# Patient Record
Sex: FEMALE | Race: WHITE | URBAN - METROPOLITAN AREA
[De-identification: names, ages, dates, MRNs, and addresses within clinical notes are randomized per-mention and may not be internally consistent; named-entity substitution may affect disease eponyms.]

---

## 2017-01-04 ENCOUNTER — HOSPITAL ENCOUNTER (OUTPATIENT)
Dept: WOUND CARE | Age: 82
Discharge: HOME | End: 2017-01-04
Attending: INTERNAL MEDICINE | Admitting: INTERNAL MEDICINE

## 2017-01-04 VITALS
HEART RATE: 64 BPM | TEMPERATURE: 98.4 F | DIASTOLIC BLOOD PRESSURE: 72 MMHG | RESPIRATION RATE: 16 BRPM | SYSTOLIC BLOOD PRESSURE: 159 MMHG

## 2017-01-04 DIAGNOSIS — L89.522 DECUBITUS ULCER OF LEFT ANKLE, STAGE 2 (HCC): ICD-10-CM

## 2017-01-04 DIAGNOSIS — L89.622 DECUBITUS ULCER OF LEFT HEEL, STAGE 2 (HCC): Primary | ICD-10-CM

## 2017-01-04 PROCEDURE — 11042 DBRDMT SUBQ TIS 1ST 20SQCM/<: CPT | Performed by: INTERNAL MEDICINE

## 2017-01-04 RX ORDER — LIDOCAINE HYDROCHLORIDE 40 MG/ML
SOLUTION TOPICAL ONCE
Status: DISCONTINUED | OUTPATIENT
Start: 2017-01-04 | End: 2017-01-05 | Stop reason: HOSPADM

## 2017-01-11 ENCOUNTER — HOSPITAL ENCOUNTER (OUTPATIENT)
Dept: WOUND CARE | Age: 82
Discharge: HOME | End: 2017-01-11
Attending: SURGERY | Admitting: SURGERY

## 2017-01-11 VITALS
DIASTOLIC BLOOD PRESSURE: 73 MMHG | SYSTOLIC BLOOD PRESSURE: 150 MMHG | HEART RATE: 60 BPM | RESPIRATION RATE: 16 BRPM | TEMPERATURE: 97.2 F

## 2017-01-11 DIAGNOSIS — L89.622 DECUBITUS ULCER OF LEFT HEEL, STAGE 2 (HCC): ICD-10-CM

## 2017-01-11 DIAGNOSIS — L89.522 DECUBITUS ULCER OF LEFT ANKLE, STAGE 2 (HCC): Primary | ICD-10-CM

## 2017-01-11 PROCEDURE — 11042 DBRDMT SUBQ TIS 1ST 20SQCM/<: CPT | Performed by: SURGERY

## 2017-01-18 ENCOUNTER — HOSPITAL ENCOUNTER (OUTPATIENT)
Dept: WOUND CARE | Age: 82
Discharge: HOME | End: 2017-01-18
Attending: SURGERY | Admitting: SURGERY

## 2017-01-18 VITALS
HEART RATE: 61 BPM | RESPIRATION RATE: 16 BRPM | DIASTOLIC BLOOD PRESSURE: 74 MMHG | SYSTOLIC BLOOD PRESSURE: 148 MMHG | TEMPERATURE: 97.1 F

## 2017-01-18 DIAGNOSIS — L89.522 DECUBITUS ULCER OF LEFT ANKLE, STAGE 2 (HCC): Primary | ICD-10-CM

## 2017-01-18 DIAGNOSIS — L89.622 DECUBITUS ULCER OF LEFT HEEL, STAGE 2 (HCC): ICD-10-CM

## 2017-01-18 PROCEDURE — 11042 DBRDMT SUBQ TIS 1ST 20SQCM/<: CPT | Performed by: SURGERY

## 2017-01-25 ENCOUNTER — HOSPITAL ENCOUNTER (OUTPATIENT)
Dept: WOUND CARE | Age: 82
Discharge: HOME | End: 2017-01-25
Attending: SURGERY | Admitting: SURGERY

## 2017-01-25 VITALS
TEMPERATURE: 96.3 F | RESPIRATION RATE: 16 BRPM | HEART RATE: 88 BPM | SYSTOLIC BLOOD PRESSURE: 159 MMHG | DIASTOLIC BLOOD PRESSURE: 77 MMHG

## 2017-01-25 DIAGNOSIS — L89.522 DECUBITUS ULCER OF LEFT ANKLE, STAGE 2 (HCC): ICD-10-CM

## 2017-01-25 DIAGNOSIS — L89.622 DECUBITUS ULCER OF LEFT HEEL, STAGE 2 (HCC): Primary | ICD-10-CM

## 2017-01-25 PROCEDURE — 11042 DBRDMT SUBQ TIS 1ST 20SQCM/<: CPT | Performed by: SURGERY

## 2017-01-30 ENCOUNTER — TELEPHONE (OUTPATIENT)
Dept: CARDIOLOGY CLINIC | Age: 82
End: 2017-01-30

## 2017-01-30 ENCOUNTER — PROCEDURE VISIT (OUTPATIENT)
Dept: CARDIOLOGY CLINIC | Age: 82
End: 2017-01-30

## 2017-01-30 DIAGNOSIS — Z95.0 CARDIAC PACEMAKER IN SITU: Primary | ICD-10-CM

## 2017-01-30 PROCEDURE — 93294 REM INTERROG EVL PM/LDLS PM: CPT | Performed by: INTERNAL MEDICINE

## 2017-01-30 PROCEDURE — 93296 REM INTERROG EVL PM/IDS: CPT | Performed by: INTERNAL MEDICINE

## 2017-02-01 ENCOUNTER — HOSPITAL ENCOUNTER (OUTPATIENT)
Dept: OTHER | Age: 82
Discharge: HOME | End: 2017-02-28
Attending: INTERNAL MEDICINE | Admitting: INTERNAL MEDICINE

## 2017-02-01 ENCOUNTER — HOSPITAL ENCOUNTER (OUTPATIENT)
Dept: WOUND CARE | Age: 82
Discharge: HOME | End: 2017-02-01
Attending: SURGERY | Admitting: SURGERY

## 2017-02-01 VITALS
HEART RATE: 63 BPM | RESPIRATION RATE: 16 BRPM | TEMPERATURE: 97.3 F | SYSTOLIC BLOOD PRESSURE: 134 MMHG | DIASTOLIC BLOOD PRESSURE: 78 MMHG

## 2017-02-01 DIAGNOSIS — L89.622 DECUBITUS ULCER OF LEFT HEEL, STAGE 2 (HCC): ICD-10-CM

## 2017-02-01 DIAGNOSIS — L89.522 DECUBITUS ULCER OF LEFT ANKLE, STAGE 2 (HCC): Primary | ICD-10-CM

## 2017-02-01 PROCEDURE — 97597 DBRDMT OPN WND 1ST 20 CM/<: CPT | Performed by: SURGERY

## 2017-02-08 ENCOUNTER — HOSPITAL ENCOUNTER (OUTPATIENT)
Dept: WOUND CARE | Age: 82
Discharge: HOME | End: 2017-02-08
Attending: SURGERY | Admitting: SURGERY

## 2017-02-08 VITALS — SYSTOLIC BLOOD PRESSURE: 150 MMHG | HEART RATE: 60 BPM | TEMPERATURE: 97.6 F | DIASTOLIC BLOOD PRESSURE: 77 MMHG

## 2017-02-08 DIAGNOSIS — L89.622 DECUBITUS ULCER OF LEFT HEEL, STAGE 2 (HCC): Primary | ICD-10-CM

## 2017-02-08 PROCEDURE — 99213 OFFICE O/P EST LOW 20 MIN: CPT | Performed by: SURGERY

## 2017-02-14 LAB
ANION GAP SERPL CALCULATED.3IONS-SCNC: 15 MMOL/L (ref 4–16)
BUN BLDV-MCNC: 37 MG/DL (ref 6–23)
CALCIUM SERPL-MCNC: 9.8 MG/DL (ref 8.3–10.6)
CHLORIDE BLD-SCNC: 100 MMOL/L (ref 99–110)
CO2: 26 MMOL/L (ref 21–32)
CREAT SERPL-MCNC: 1 MG/DL (ref 0.6–1.1)
GFR AFRICAN AMERICAN: >60 ML/MIN/1.73M2
GFR NON-AFRICAN AMERICAN: 53 ML/MIN/1.73M2
GLUCOSE BLD-MCNC: 89 MG/DL (ref 70–140)
HCT VFR BLD CALC: 43.9 % (ref 37–47)
HEMOGLOBIN: 13.7 GM/DL (ref 12.5–16)
MCH RBC QN AUTO: 29.9 PG (ref 27–31)
MCHC RBC AUTO-ENTMCNC: 31.2 % (ref 32–36)
MCV RBC AUTO: 95.9 FL (ref 78–100)
PDW BLD-RTO: 14.7 % (ref 11.7–14.9)
PLATELET # BLD: 291 K/CU MM (ref 140–440)
PMV BLD AUTO: 9.9 FL (ref 7.5–11.1)
POTASSIUM SERPL-SCNC: 4.1 MMOL/L (ref 3.5–5.1)
RBC # BLD: 4.58 M/CU MM (ref 4.2–5.4)
SODIUM BLD-SCNC: 141 MMOL/L (ref 135–145)
WBC # BLD: 11 K/CU MM (ref 4–10.5)

## 2017-02-15 ENCOUNTER — HOSPITAL ENCOUNTER (OUTPATIENT)
Dept: WOUND CARE | Age: 82
Discharge: HOME | End: 2017-02-15
Attending: SURGERY | Admitting: SURGERY

## 2017-02-22 ENCOUNTER — HOSPITAL ENCOUNTER (OUTPATIENT)
Dept: WOUND CARE | Age: 82
Discharge: HOME | End: 2017-02-22
Attending: SURGERY | Admitting: SURGERY

## 2017-02-22 VITALS
RESPIRATION RATE: 16 BRPM | TEMPERATURE: 97.2 F | DIASTOLIC BLOOD PRESSURE: 73 MMHG | SYSTOLIC BLOOD PRESSURE: 154 MMHG | HEART RATE: 62 BPM

## 2017-02-22 DIAGNOSIS — L89.622 DECUBITUS ULCER OF LEFT HEEL, STAGE 2 (HCC): Primary | ICD-10-CM

## 2017-02-22 PROCEDURE — 99213 OFFICE O/P EST LOW 20 MIN: CPT | Performed by: SURGERY

## 2017-03-01 ENCOUNTER — HOSPITAL ENCOUNTER (OUTPATIENT)
Dept: OTHER | Age: 82
Discharge: HOME | End: 2017-03-31
Attending: INTERNAL MEDICINE | Admitting: INTERNAL MEDICINE

## 2017-04-01 ENCOUNTER — HOSPITAL ENCOUNTER (OUTPATIENT)
Dept: OTHER | Age: 82
Discharge: HOME | End: 2017-04-30
Attending: INTERNAL MEDICINE | Admitting: INTERNAL MEDICINE

## 2017-04-11 LAB
INR BLD: 1.31 INDEX
PROTHROMBIN TIME: 15 SECONDS

## 2017-04-13 LAB
INR BLD: 2.1 INDEX
PROTHROMBIN TIME: 24.5 SECONDS (ref 9.12–12.5)

## 2017-04-18 LAB
INR BLD: 4.15 INDEX
PROTHROMBIN TIME: 49.5 SECONDS

## 2017-04-20 LAB
INR BLD: 6.66 INDEX
PROTHROMBIN TIME: 80.5 SECONDS (ref 9.12–12.5)

## 2017-04-22 LAB
INR BLD: 1.62 INDEX
PROTHROMBIN TIME: 18.8 SECONDS (ref 9.12–12.5)

## 2017-04-28 LAB
INR BLD: 2.89 INDEX
PROTHROMBIN TIME: 34 SECONDS (ref 9.12–12.5)

## 2017-05-01 ENCOUNTER — HOSPITAL ENCOUNTER (OUTPATIENT)
Dept: OTHER | Age: 82
Discharge: HOME | End: 2017-05-31
Attending: INTERNAL MEDICINE | Admitting: INTERNAL MEDICINE

## 2017-05-02 LAB
INR BLD: 3.49 INDEX
PROTHROMBIN TIME: 41.3 SECONDS

## 2017-05-08 ENCOUNTER — PROCEDURE VISIT (OUTPATIENT)
Dept: CARDIOLOGY CLINIC | Age: 82
End: 2017-05-08

## 2017-05-08 DIAGNOSIS — Z95.0 CARDIAC PACEMAKER IN SITU: Primary | ICD-10-CM

## 2017-05-08 PROCEDURE — 93294 REM INTERROG EVL PM/LDLS PM: CPT | Performed by: INTERNAL MEDICINE

## 2017-05-08 PROCEDURE — 93296 REM INTERROG EVL PM/IDS: CPT | Performed by: INTERNAL MEDICINE

## 2017-06-01 ENCOUNTER — HOSPITAL ENCOUNTER (OUTPATIENT)
Dept: OTHER | Age: 82
Discharge: HOME | End: 2017-06-30
Attending: INTERNAL MEDICINE | Admitting: INTERNAL MEDICINE

## 2017-06-01 LAB
INR BLD: 3.12 INDEX
PROTHROMBIN TIME: 36.9 SECONDS (ref 9.12–12.5)

## 2017-07-01 ENCOUNTER — HOSPITAL ENCOUNTER (OUTPATIENT)
Dept: OTHER | Age: 82
Discharge: HOME | End: 2017-07-31
Attending: INTERNAL MEDICINE | Admitting: INTERNAL MEDICINE

## 2017-07-20 LAB
ANION GAP SERPL CALCULATED.3IONS-SCNC: 14 MMOL/L (ref 4–16)
BUN BLDV-MCNC: 32 MG/DL (ref 6–23)
CALCIUM SERPL-MCNC: 8.6 MG/DL (ref 8.3–10.6)
CHLORIDE BLD-SCNC: 105 MMOL/L (ref 99–110)
CO2: 22 MMOL/L (ref 21–32)
CREAT SERPL-MCNC: 0.8 MG/DL (ref 0.6–1.1)
GFR AFRICAN AMERICAN: >60 ML/MIN/1.73M2
GFR NON-AFRICAN AMERICAN: >60 ML/MIN/1.73M2
GLUCOSE BLD-MCNC: 71 MG/DL (ref 70–140)
HCT VFR BLD CALC: 31.7 % (ref 37–47)
HEMOGLOBIN: 10 GM/DL (ref 12.5–16)
MCH RBC QN AUTO: 30.1 PG (ref 27–31)
MCHC RBC AUTO-ENTMCNC: 31.5 % (ref 32–36)
MCV RBC AUTO: 95.5 FL (ref 78–100)
PDW BLD-RTO: 15.8 % (ref 11.7–14.9)
PLATELET # BLD: 263 K/CU MM (ref 140–440)
PMV BLD AUTO: 10 FL (ref 7.5–11.1)
POTASSIUM SERPL-SCNC: 4.1 MMOL/L (ref 3.5–5.1)
RBC # BLD: 3.32 M/CU MM (ref 4.2–5.4)
SODIUM BLD-SCNC: 141 MMOL/L (ref 135–145)
WBC # BLD: 12.8 K/CU MM (ref 4–10.5)

## 2017-07-25 LAB
INR BLD: >10.1 INDEX
PROTHROMBIN TIME: >120 SECONDS

## 2017-07-27 LAB
ESTIMATED AVERAGE GLUCOSE: 126 MG/DL
HBA1C MFR BLD: 6 % (ref 4.2–6.3)
HCT VFR BLD CALC: 35 % (ref 37–47)
HEMOGLOBIN: 10.8 GM/DL (ref 12.5–16)
INR BLD: 1.29 INDEX
MCH RBC QN AUTO: 29.8 PG (ref 27–31)
MCHC RBC AUTO-ENTMCNC: 30.9 % (ref 32–36)
MCV RBC AUTO: 96.4 FL (ref 78–100)
PDW BLD-RTO: 15.4 % (ref 11.7–14.9)
PLATELET # BLD: 425 K/CU MM (ref 140–440)
PMV BLD AUTO: 9.2 FL (ref 7.5–11.1)
PROTHROMBIN TIME: 14.8 SECONDS
RBC # BLD: 3.63 M/CU MM (ref 4.2–5.4)
TSH HIGH SENSITIVITY: 3.87 UIU/ML (ref 0.27–4.2)
VITAMIN B-12: 1211 PG/ML (ref 211–911)
WBC # BLD: 9.1 K/CU MM (ref 4–10.5)

## 2017-08-01 ENCOUNTER — HOSPITAL ENCOUNTER (OUTPATIENT)
Dept: OTHER | Age: 82
Discharge: HOME | End: 2017-08-31
Attending: INTERNAL MEDICINE | Admitting: INTERNAL MEDICINE

## 2017-08-03 ENCOUNTER — OFFICE VISIT (OUTPATIENT)
Dept: CARDIOLOGY CLINIC | Age: 82
End: 2017-08-03

## 2017-08-03 VITALS — SYSTOLIC BLOOD PRESSURE: 100 MMHG | HEART RATE: 60 BPM | DIASTOLIC BLOOD PRESSURE: 58 MMHG | HEIGHT: 62 IN

## 2017-08-03 DIAGNOSIS — Z95.0 S/P CARDIAC PACEMAKER PROCEDURE: ICD-10-CM

## 2017-08-03 DIAGNOSIS — Z95.0 CARDIAC PACEMAKER IN SITU: Primary | ICD-10-CM

## 2017-08-03 LAB
INR BLD: 1.57 INDEX
PROTHROMBIN TIME: 18.2 SECONDS

## 2017-08-03 PROCEDURE — 99213 OFFICE O/P EST LOW 20 MIN: CPT | Performed by: INTERNAL MEDICINE

## 2017-08-03 RX ORDER — FERROUS SULFATE 325(65) MG
325 TABLET ORAL
Status: ON HOLD | COMMUNITY
End: 2018-02-16 | Stop reason: HOSPADM

## 2017-08-03 RX ORDER — WARFARIN SODIUM 2 MG/1
1.5 TABLET ORAL DAILY
Status: ON HOLD | COMMUNITY
Start: 2017-06-08 | End: 2018-02-16 | Stop reason: HOSPADM

## 2017-08-14 ENCOUNTER — PROCEDURE VISIT (OUTPATIENT)
Dept: CARDIOLOGY CLINIC | Age: 82
End: 2017-08-14

## 2017-08-14 DIAGNOSIS — Z95.0 CARDIAC PACEMAKER IN SITU: Primary | ICD-10-CM

## 2017-08-14 PROCEDURE — 93296 REM INTERROG EVL PM/IDS: CPT | Performed by: INTERNAL MEDICINE

## 2017-08-14 PROCEDURE — 93294 REM INTERROG EVL PM/LDLS PM: CPT | Performed by: INTERNAL MEDICINE

## 2017-08-17 LAB
INR BLD: 3.03 INDEX
PROTHROMBIN TIME: 35.7 SECONDS

## 2017-09-01 ENCOUNTER — HOSPITAL ENCOUNTER (OUTPATIENT)
Dept: OTHER | Age: 82
Discharge: HOME | End: 2017-09-30
Attending: INTERNAL MEDICINE | Admitting: INTERNAL MEDICINE

## 2017-09-05 LAB
INR BLD: 3.15 INDEX
PROTHROMBIN TIME: 37.2 SECONDS

## 2017-09-21 LAB
INR BLD: 9.55 INDEX
PROTHROMBIN TIME: 116.7 SECONDS (ref 9.12–12.5)

## 2017-09-23 LAB
INR BLD: 1.31 INDEX
PROTHROMBIN TIME: 15 SECONDS (ref 9.12–12.5)

## 2017-09-26 LAB
INR BLD: 1.25 INDEX
PROTHROMBIN TIME: 14.4 SECONDS

## 2017-10-01 ENCOUNTER — HOSPITAL ENCOUNTER (OUTPATIENT)
Dept: OTHER | Age: 82
Discharge: HOME | End: 2017-10-31
Attending: INTERNAL MEDICINE | Admitting: INTERNAL MEDICINE

## 2017-10-05 LAB
INR BLD: 1.22 INDEX
PROTHROMBIN TIME: 14 SECONDS (ref 9.12–12.5)

## 2017-10-13 LAB
ALBUMIN SERPL-MCNC: 3.9 GM/DL (ref 3.4–5)
ALP BLD-CCNC: 58 IU/L (ref 40–128)
ALT SERPL-CCNC: 10 U/L (ref 10–40)
ANION GAP SERPL CALCULATED.3IONS-SCNC: 17 MMOL/L (ref 4–16)
AST SERPL-CCNC: 20 IU/L (ref 15–37)
BILIRUB SERPL-MCNC: 0.4 MG/DL (ref 0–1)
BUN BLDV-MCNC: 18 MG/DL (ref 6–23)
CALCIUM SERPL-MCNC: 9.9 MG/DL (ref 8.3–10.6)
CHLORIDE BLD-SCNC: 99 MMOL/L (ref 99–110)
CO2: 20 MMOL/L (ref 21–32)
CREAT SERPL-MCNC: 0.5 MG/DL (ref 0.6–1.1)
GFR AFRICAN AMERICAN: >60 ML/MIN/1.73M2
GFR NON-AFRICAN AMERICAN: >60 ML/MIN/1.73M2
GLUCOSE BLD-MCNC: 116 MG/DL (ref 70–140)
HCT VFR BLD CALC: 40.2 % (ref 37–47)
HEMOGLOBIN: 12 GM/DL (ref 12.5–16)
INR BLD: 1.44 INDEX
MCH RBC QN AUTO: 29.4 PG (ref 27–31)
MCHC RBC AUTO-ENTMCNC: 29.9 % (ref 32–36)
MCV RBC AUTO: 98.5 FL (ref 78–100)
PDW BLD-RTO: 16.4 % (ref 11.7–14.9)
PLATELET # BLD: 278 K/CU MM (ref 140–440)
PMV BLD AUTO: 10.5 FL (ref 7.5–11.1)
POTASSIUM SERPL-SCNC: 4.5 MMOL/L (ref 3.5–5.1)
PROTHROMBIN TIME: 16.6 SECONDS
RBC # BLD: 4.08 M/CU MM (ref 4.2–5.4)
SODIUM BLD-SCNC: 136 MMOL/L (ref 135–145)
TOTAL PROTEIN: 7.1 GM/DL (ref 6.4–8.2)
WBC # BLD: 8.8 K/CU MM (ref 4–10.5)

## 2017-11-01 ENCOUNTER — HOSPITAL ENCOUNTER (OUTPATIENT)
Dept: OTHER | Age: 82
Discharge: HOME | End: 2017-11-30
Attending: INTERNAL MEDICINE | Admitting: INTERNAL MEDICINE

## 2017-11-02 LAB
INR BLD: 1.33 INDEX
PROTHROMBIN TIME: 15.3 SECONDS

## 2017-11-16 LAB
INR BLD: 2.15 INDEX
PROTHROMBIN TIME: 25.1 SECONDS (ref 9.12–12.5)

## 2017-11-20 ENCOUNTER — TELEPHONE (OUTPATIENT)
Dept: CARDIOLOGY CLINIC | Age: 82
End: 2017-11-20

## 2017-11-21 ENCOUNTER — TELEPHONE (OUTPATIENT)
Dept: CARDIOLOGY CLINIC | Age: 82
End: 2017-11-21

## 2017-11-21 ENCOUNTER — PROCEDURE VISIT (OUTPATIENT)
Dept: CARDIOLOGY CLINIC | Age: 82
End: 2017-11-21

## 2017-11-21 DIAGNOSIS — Z95.0 CARDIAC PACEMAKER IN SITU: Primary | ICD-10-CM

## 2017-11-21 PROCEDURE — 93296 REM INTERROG EVL PM/IDS: CPT | Performed by: INTERNAL MEDICINE

## 2017-11-21 PROCEDURE — 93294 REM INTERROG EVL PM/LDLS PM: CPT | Performed by: INTERNAL MEDICINE

## 2017-12-01 ENCOUNTER — HOSPITAL ENCOUNTER (OUTPATIENT)
Dept: OTHER | Age: 82
Discharge: HOME | End: 2017-12-31
Attending: INTERNAL MEDICINE | Admitting: INTERNAL MEDICINE

## 2017-12-05 LAB
INR BLD: 1.41 INDEX
PROTHROMBIN TIME: 16 SECONDS (ref 9.12–12.5)

## 2018-01-01 ENCOUNTER — HOSPITAL ENCOUNTER (OUTPATIENT)
Dept: OTHER | Age: 83
Discharge: HOME | End: 2018-01-31
Attending: INTERNAL MEDICINE | Admitting: INTERNAL MEDICINE

## 2018-01-04 LAB
INR BLD: 1.85 INDEX
PROTHROMBIN TIME: 21 SECONDS (ref 9.12–12.5)

## 2018-01-11 LAB
INR BLD: 1.6 INDEX
PROTHROMBIN TIME: 18.1 SECONDS (ref 9.12–12.5)

## 2018-02-01 ENCOUNTER — HOSPITAL ENCOUNTER (OUTPATIENT)
Dept: OTHER | Age: 83
Discharge: HOME | End: 2018-02-28
Attending: INTERNAL MEDICINE | Admitting: INTERNAL MEDICINE

## 2018-02-14 PROBLEM — S52.571A CLOSED DIE PUNCH FRACTURE DISTAL RADIUS, RIGHT, INITIAL ENCOUNTER: Status: ACTIVE | Noted: 2018-02-14

## 2018-02-14 PROBLEM — S52.591A: Status: ACTIVE | Noted: 2018-02-14
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
## 2018-02-14 PROBLEM — N30.00 ACUTE CYSTITIS: Status: ACTIVE | Noted: 2018-02-14

## 2018-03-01 ENCOUNTER — HOSPITAL ENCOUNTER (OUTPATIENT)
Dept: OTHER | Age: 83
Discharge: HOME | End: 2018-03-31
Attending: INTERNAL MEDICINE | Admitting: INTERNAL MEDICINE

## 2018-03-08 LAB
ALBUMIN SERPL-MCNC: 3.1 GM/DL (ref 3.4–5)
ALP BLD-CCNC: 61 IU/L (ref 40–129)
ALT SERPL-CCNC: 6 U/L (ref 10–40)
ANION GAP SERPL CALCULATED.3IONS-SCNC: 12 MMOL/L (ref 4–16)
AST SERPL-CCNC: 14 IU/L (ref 15–37)
BASOPHILS ABSOLUTE: 0 K/CU MM
BASOPHILS RELATIVE PERCENT: 0.5 % (ref 0–1)
BILIRUB SERPL-MCNC: 0.2 MG/DL (ref 0–1)
BUN BLDV-MCNC: 19 MG/DL (ref 6–23)
CALCIUM SERPL-MCNC: 9 MG/DL (ref 8.3–10.6)
CHLORIDE BLD-SCNC: 103 MMOL/L (ref 99–110)
CO2: 22 MMOL/L (ref 21–32)
CREAT SERPL-MCNC: 0.7 MG/DL (ref 0.6–1.1)
DIFFERENTIAL TYPE: ABNORMAL
EOSINOPHILS ABSOLUTE: 0.2 K/CU MM
EOSINOPHILS RELATIVE PERCENT: 2.3 % (ref 0–3)
GFR AFRICAN AMERICAN: >60 ML/MIN/1.73M2
GFR NON-AFRICAN AMERICAN: >60 ML/MIN/1.73M2
GLUCOSE BLD-MCNC: 105 MG/DL (ref 70–99)
HCT VFR BLD CALC: 33.7 % (ref 37–47)
HEMOGLOBIN: 10.1 GM/DL (ref 12.5–16)
IMMATURE NEUTROPHIL %: 0.4 % (ref 0–0.43)
LYMPHOCYTES ABSOLUTE: 0.9 K/CU MM
LYMPHOCYTES RELATIVE PERCENT: 11.6 % (ref 24–44)
MCH RBC QN AUTO: 29.2 PG (ref 27–31)
MCHC RBC AUTO-ENTMCNC: 30 % (ref 32–36)
MCV RBC AUTO: 97.4 FL (ref 78–100)
MONOCYTES ABSOLUTE: 0.7 K/CU MM
MONOCYTES RELATIVE PERCENT: 8.8 % (ref 0–4)
NUCLEATED RBC %: 0 %
PDW BLD-RTO: 14.3 % (ref 11.7–14.9)
PLATELET # BLD: 417 K/CU MM (ref 140–440)
PMV BLD AUTO: 9.7 FL (ref 7.5–11.1)
POTASSIUM SERPL-SCNC: 5.4 MMOL/L (ref 3.5–5.1)
RBC # BLD: 3.46 M/CU MM (ref 4.2–5.4)
SEGMENTED NEUTROPHILS ABSOLUTE COUNT: 6 K/CU MM
SEGMENTED NEUTROPHILS RELATIVE PERCENT: 76.4 % (ref 36–66)
SODIUM BLD-SCNC: 137 MMOL/L (ref 135–145)
TOTAL IMMATURE NEUTOROPHIL: 0.03 K/CU MM
TOTAL NUCLEATED RBC: 0 K/CU MM
TOTAL PROTEIN: 5.7 GM/DL (ref 6.4–8.2)
WBC # BLD: 7.9 K/CU MM (ref 4–10.5)

## 2018-03-15 LAB
INR BLD: >10.1 INDEX
PROTHROMBIN TIME: >120 SECONDS (ref 9.12–12.5)

## 2018-03-16 LAB
INR BLD: 2.54 INDEX
PROTHROMBIN TIME: 29.2 SECONDS

## 2018-03-20 LAB
INR BLD: 3.58 INDEX
PROTHROMBIN TIME: 40.3 SECONDS (ref 9.12–12.5)

## 2018-03-21 LAB
INR BLD: 3.88 INDEX
PROTHROMBIN TIME: 44.4 SECONDS

## 2018-03-22 LAB
INR BLD: 2.41 INDEX
PROTHROMBIN TIME: 27.7 SECONDS

## 2018-04-01 ENCOUNTER — HOSPITAL ENCOUNTER (OUTPATIENT)
Dept: OTHER | Age: 83
Discharge: HOME | End: 2018-04-30
Attending: INTERNAL MEDICINE | Admitting: INTERNAL MEDICINE

## 2018-04-03 LAB
INR BLD: 3.01 INDEX
PROTHROMBIN TIME: 33.9 SECONDS (ref 9.12–12.5)

## 2018-04-04 LAB
INR BLD: 3.41 INDEX
PROTHROMBIN TIME: 38.4 SECONDS (ref 9.12–12.5)

## 2018-04-05 LAB
INR BLD: 2.49 INDEX
PROTHROMBIN TIME: 28.1 SECONDS (ref 9.12–12.5)
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
## 2018-04-12 ENCOUNTER — TELEPHONE (OUTPATIENT)
Dept: CARDIOLOGY CLINIC | Age: 83
End: 2018-04-12

## 2018-05-01 ENCOUNTER — HOSPITAL ENCOUNTER (OUTPATIENT)
Dept: OTHER | Age: 83
Discharge: HOME | End: 2018-05-31
Attending: INTERNAL MEDICINE | Admitting: INTERNAL MEDICINE

## 2020-07-14 NOTE — TELEPHONE ENCOUNTER
All days 7/10 and 7/11 are now observation    Patient Name: Teresita Kasper      Admit Date/Time: 07/10/20 4:44 PM Patient Class: OBSERVATION [104]   [facility] Date/Time: 07/12/20 12:20 PM       Thanks!     Mike Maldonado   Tel: [phone] Left message for patient to send carelink transmission.